# Patient Record
(demographics unavailable — no encounter records)

---

## 2025-02-03 NOTE — HISTORY OF PRESENT ILLNESS
[de-identified] : Age: 63 year F PMHx: HLD, hypothyroidism Hand Dominance: RHD Chief Complaint: Right middle finger pain onset approx. July 2024 with NKI. Patient reports that her finger is triggering, clicking sticking and locking. Patient reports that she received a CSI from another orthopedist on 07/19/24 and reports it provided relief until late December 2024. Patient is interested in another CSI today. Denies numbness/tingling.  Trauma: NKI Outside Imaging/Treatment: CSI on 07/19/24 OTC Medications: none OT/PT: none Bracing: none Pain worse with: making a fist Pain better with: rest

## 2025-03-06 NOTE — HISTORY OF PRESENT ILLNESS
[LMP unknown] : LMP unknown [N] : Patient is not sexually active [Y] : Positive pregnancy history [Patient reported mammogram was normal] : Patient reported mammogram was normal [Patient reported PAP Smear was normal] : Patient reported PAP Smear was normal [Patient reported bone density results were normal] : Patient reported bone density results were normal [Patient reported colonoscopy was normal] : Patient reported colonoscopy was normal [FreeTextEntry1] : 63 yr old P2 here for annual exam. Patient reports no gyn problems. [Mammogramdate] : 2024 [PapSmeardate] : 2023 [BoneDensityDate] : 2023 [ColonoscopyDate] : 2024 [PGHxTotal] : 3 [[facility] [place]] : 2 [[facility]] : 3 [PGHxABSpont] : 1